# Patient Record
Sex: FEMALE | Race: BLACK OR AFRICAN AMERICAN | NOT HISPANIC OR LATINO | Employment: OTHER | URBAN - METROPOLITAN AREA
[De-identification: names, ages, dates, MRNs, and addresses within clinical notes are randomized per-mention and may not be internally consistent; named-entity substitution may affect disease eponyms.]

---

## 2024-07-18 ENCOUNTER — OFFICE VISIT (OUTPATIENT)
Dept: PODIATRY | Facility: CLINIC | Age: 44
End: 2024-07-18
Payer: MEDICAID

## 2024-07-18 VITALS — HEIGHT: 71 IN | WEIGHT: 242.38 LBS | BODY MASS INDEX: 33.93 KG/M2

## 2024-07-18 DIAGNOSIS — L84 CORN OR CALLUS: ICD-10-CM

## 2024-07-18 DIAGNOSIS — G60.9 IDIOPATHIC PERIPHERAL NEUROPATHY: ICD-10-CM

## 2024-07-18 DIAGNOSIS — M76.71 TENDINITIS OF RIGHT PERONEUS BREVIS TENDON: Primary | ICD-10-CM

## 2024-07-18 PROCEDURE — 99203 OFFICE O/P NEW LOW 30 MIN: CPT | Mod: S$GLB,,, | Performed by: PODIATRIST

## 2024-07-18 PROCEDURE — 3008F BODY MASS INDEX DOCD: CPT | Mod: CPTII,S$GLB,, | Performed by: PODIATRIST

## 2024-07-18 PROCEDURE — 1159F MED LIST DOCD IN RCRD: CPT | Mod: CPTII,S$GLB,, | Performed by: PODIATRIST

## 2024-07-18 PROCEDURE — 1160F RVW MEDS BY RX/DR IN RCRD: CPT | Mod: CPTII,S$GLB,, | Performed by: PODIATRIST

## 2024-07-18 RX ORDER — PROPRANOLOL HYDROCHLORIDE 20 MG/1
TABLET ORAL
COMMUNITY

## 2024-07-18 RX ORDER — AMLODIPINE BESYLATE 10 MG/1
TABLET ORAL
COMMUNITY
Start: 2024-01-03

## 2024-07-18 RX ORDER — POLYETHYLENE GLYCOL-3350 AND ELECTROLYTES 236; 6.74; 5.86; 2.97; 22.74 G/274.31G; G/274.31G; G/274.31G; G/274.31G; G/274.31G
POWDER, FOR SOLUTION ORAL
COMMUNITY

## 2024-07-18 RX ORDER — ATORVASTATIN CALCIUM 10 MG/1
TABLET, FILM COATED ORAL
COMMUNITY
Start: 2024-01-03

## 2024-07-18 RX ORDER — CLONAZEPAM 0.5 MG/1
0.5 TABLET ORAL 2 TIMES DAILY PRN
COMMUNITY

## 2024-07-18 RX ORDER — ONDANSETRON 4 MG/1
TABLET, FILM COATED ORAL
COMMUNITY
Start: 2024-07-02

## 2024-07-18 RX ORDER — PREGABALIN 75 MG/1
75 CAPSULE ORAL 2 TIMES DAILY
COMMUNITY
Start: 2024-07-01

## 2024-07-18 RX ORDER — TRAZODONE HYDROCHLORIDE 100 MG/1
TABLET ORAL
COMMUNITY
Start: 2023-09-25

## 2024-07-18 RX ORDER — ESCITALOPRAM OXALATE 10 MG/1
TABLET ORAL
COMMUNITY

## 2024-07-18 RX ORDER — LACTULOSE 10 G/15ML
SOLUTION ORAL; RECTAL
COMMUNITY
Start: 2024-01-29

## 2024-07-18 RX ORDER — LINACLOTIDE 145 UG/1
CAPSULE, GELATIN COATED ORAL
COMMUNITY
Start: 2024-07-02

## 2024-07-18 RX ORDER — OMEPRAZOLE 40 MG/1
CAPSULE, DELAYED RELEASE ORAL
COMMUNITY
Start: 2024-07-02

## 2024-07-18 NOTE — PROGRESS NOTES
Subjective:     Patient ID: Silvina Laguerre is a 44 y.o. female    Chief Complaint: Foot Pain       Silvina is a 44 y.o. female who presents to the podiatry clinic  with complaint of  right foot pain. Onset of the symptoms was about a month ago. Precipitating event: increased activity. Current symptoms include: ability to bear weight, but with some pain and worsening symptoms after a period of activity. Aggravating factors: walking. Symptoms have gradually worsened. Patient has had prior foot problems.  Treatment to date:  Oral Lyrica for nerve pain . Patients rates pain 4/10 on pain scale.    Review of Systems   Constitutional: Negative.  Negative for chills and fever.   Respiratory:  Negative for cough and shortness of breath.    Cardiovascular:  Negative for chest pain and leg swelling.   Gastrointestinal:  Negative for diarrhea, nausea and vomiting.   Neurological:  Positive for tingling.        Objective:   Physical Exam  Vitals reviewed.   Constitutional:       General: She is not in acute distress.     Appearance: Normal appearance. She is not ill-appearing.   HENT:      Head: Normocephalic.      Nose: Nose normal.   Pulmonary:      Effort: Pulmonary effort is normal. No respiratory distress.   Skin:     Capillary Refill: Capillary refill takes 2 to 3 seconds.   Neurological:      Mental Status: She is alert and oriented to person, place, and time.   Psychiatric:         Mood and Affect: Mood normal.         Behavior: Behavior normal.         Thought Content: Thought content normal.         Judgment: Judgment normal.        Foot Exam    General  General Appearance: appears stated age and healthy   Orientation: alert and oriented to person, place, and time   Affect: appropriate   Assistance: cane use          Vascular: DP and PT pulses palpable 2/4 bilateral foot   Neurologic:  Positive paresthesias reported right foot greater than left  Musculoskeletal:  Mild pain and tenderness with palpation range of motion  right peroneal brevis tendon, mild tenderness with palpation right 5th metatarsal base styloid process, pain and tenderness with palpation plantar soft tissue lesion right plantar lateral midfoot  Dermatologic: Hyperkeratotic skin lesion noted to the plantar aspect of the right 5th metatarsal base, no open lesions noted bilateral foot, no rashes noted bilateral foot, no interdigital maceration noted bilateral foot    Assessment:         1. Tendinitis of right peroneus brevis tendon    2. Idiopathic peripheral neuropathy    3. Corn or callus       Plan:     Silvina Laguerre was seen today for   Chief Complaint   Patient presents with    Foot Pain       Assessment & Plan           Procedures     1. Patient was examined and evaluated.    2. Discussed patient etiology of peroneal brevis tendinitis and possible association with current pain related to growth of soft tissue lesion.  Patient was advised to perform active and passive range of motion of the right foot.  Patient was advised ice and elevate right lower extremity end of days.  Patient was advised to discontinue current daily use of flip-flops and slides.  Patient was warned of potential recurrence of soft tissue lesion over time.  Discussed with patient potential benefits of local corticosteroid injection oral Medrol Dosepak for improvement of tendinitis complaints should pain complaints persist or worsen over time.    3. Discussed with patient etiology of callus formation.  Patient was warned of potential recurrence over time.  Patient was dispensed offloading pads for reduction of direct contact to the lesion.  Patient was advised to perform safe debridement at home with a emery board, nail file or pumice stone.  Patient was advised to apply ointments / moisturizer to the area for softening purposes.  4. Patient was advised to continue take oral Lyrica for improvement of peripheral neuropathy symptoms.  Patient will monitor feet on a daily basis and wear more  protective shoe gear.    5. Patient will follow-up p.r.n. for complaints      Ran Nur DPM  [id number] [address] [place] [address]  [phone]      This note was created using [employer] direct voice recognition software. Note may have occasional typographical errors that may not have been identified and edited despite initial review prior to signing.